# Patient Record
Sex: FEMALE | Race: WHITE
[De-identification: names, ages, dates, MRNs, and addresses within clinical notes are randomized per-mention and may not be internally consistent; named-entity substitution may affect disease eponyms.]

---

## 2018-03-07 ENCOUNTER — HOSPITAL ENCOUNTER (OUTPATIENT)
Dept: HOSPITAL 47 - RADMRIMAIN | Age: 53
Discharge: HOME | End: 2018-03-07
Payer: MEDICARE

## 2018-03-07 DIAGNOSIS — M47.812: ICD-10-CM

## 2018-03-07 DIAGNOSIS — M51.36: ICD-10-CM

## 2018-03-07 DIAGNOSIS — M50.21: ICD-10-CM

## 2018-03-07 DIAGNOSIS — M24.28: ICD-10-CM

## 2018-03-07 DIAGNOSIS — I67.1: Primary | ICD-10-CM

## 2018-03-07 DIAGNOSIS — R93.0: ICD-10-CM

## 2018-03-07 DIAGNOSIS — M50.30: ICD-10-CM

## 2018-03-07 DIAGNOSIS — M53.82: ICD-10-CM

## 2018-03-07 DIAGNOSIS — M46.96: ICD-10-CM

## 2018-03-07 DIAGNOSIS — M51.26: ICD-10-CM

## 2018-03-07 DIAGNOSIS — M48.061: ICD-10-CM

## 2018-03-07 PROCEDURE — 70553 MRI BRAIN STEM W/O & W/DYE: CPT

## 2018-03-07 PROCEDURE — 72156 MRI NECK SPINE W/O & W/DYE: CPT

## 2018-03-07 PROCEDURE — 72158 MRI LUMBAR SPINE W/O & W/DYE: CPT

## 2018-03-07 PROCEDURE — 70544 MR ANGIOGRAPHY HEAD W/O DYE: CPT

## 2018-03-07 NOTE — MR
EXAMINATION TYPE: MR angio head wo con

 

DATE OF EXAM: 3/7/2018

 

COMPARISON: 5/21/2012 and 12/21/2016 MRA brain

 

HISTORY: Cerebral aneurysm with previous coiling

 

TECHNIQUE: Time of flight images focusing on the [place] were performed without contrast. Th
ree-dimensional reconstructions performed on the same workstation.

 

FINDINGS: There is minimal flow continues into the 1.5 mm saccular aneurysmal outpouching at the site
 of prior 3 mm aneurysm, status post interventional aneurysmal coiling. This appears to emanate from 
the junction of the anterior communicating artery with the A2 segment of the right anterior cerebral 
artery. The left A1 segment is aplastic and there is a normal variant origin of the left A2 segment f
rom the right A1 segment.

 

No new intracranial aneurysm is seen. No vascular occlusion or dissection. The right vertebral artery
 is slightly dominant. Evaluation of intracranial structures is limited given technique and will be d
iscussed on the CT brain to be performed the same day.

 

IMPRESSION: 

1. Minimal residual flow into the now 1.5 mm (previously 3 mm) saccular aneurysm from the junction of
 the A2 segment of the right anterior cerebral artery with the anterior communicating artery, status 
post interventional aneurysmal coiling.

2. Aplastic left A1 segment with normal variant origin of the left A2 segment directly from the right
 A1 segment.

3. No new intracranial aneurysm.

## 2018-03-07 NOTE — MR
EXAMINATION TYPE: MR lumbar spine wo/w con

 

DATE OF EXAM: 3/7/2018

 

COMPARISON: NONE

 

HISTORY: Disc degeneration

 

Contrast: 7 mL Gadavist

 

TECHNIQUE: T1 and T2  axial and sagittal, postcontrast axial and sagittal images of the lumbar spine 
are submitted.  

 

FINDINGS: There is no abnormal signal seen within the visualized spinal cord or paraspinal soft tissu
es. Subcentimeter simple appearing left renal cyst noted.

 

At L1-2 there is degenerative disc disease and facet arthropathy. There is circumferential disc bulgi
ng but no canal stenosis. Mild bilateral foraminal encroachment.

 

At L2-3 there is severe degenerative disc disease with diffuse disc bulging and hypertrophy of the li
gamentum flavum and facet joints. There is moderate bilateral foraminal encroachment and mild central
 stenosis.

 

At L3-4 there is diffuse disc bulging with more advanced facet arthropathy and ligamentum flavum hype
rtrophy. Moderate bilateral foraminal encroachment and canal stenosis.

 

At L4-5 there is moderate to severe degenerative disc disease with advanced facet arthropathy and lig
amentum flavum hypertrophy. Diffuse disc bulging results in moderate canal stenosis and bilateral for
aminal encroachment greater on the left.

 

At L5-S1 there is moderate degenerative disc disease with marked facet arthropathy. There is diffuse 
disc bulging and moderate bilateral foraminal encroachment and evidence of canal stenosis.

 

 

IMPRESSION:

1. Multilevel degenerative disc disease with multilevel disc bulging, facet arthropathy and ligamentu
m flavum hypertrophy result in multilevel significant canal stenosis and foraminal encroachment as di
scussed above.

 

 

 

 

 

 

 

EXAMINATION TYPE: MR cervical spine wo/w con

 

DATE OF EXAM: 3/7/2018

 

COMPARISON: NONE

 

HISTORY: Disc degeneration

 

Contrast: 7 mL Gadavist

 

TECHNIQUE: T1 sagittal and coronal, T2 sagittal, and gradient echo axial, postcontrast T1 axial and s
agittal views of the cervical spine are submitted.

 

FINDINGS: The cranial cervical junction is preserved.  There is no abnormal signal seen within the sp
inal cord or paraspinal soft tissues.

 

At C2-3 there is no disc herniation or canal stenosis. No foraminal encroachment.

 

At C3-4 there is posterior spondylosis and central disc bulging. Mild effacement of thecal sac. There
 is no canal stenosis or foraminal encroachment. Mild facet arthropathy.

 

At C4-5 there is no disc herniation or canal stenosis. No foraminal encroachment.

 

At C5-6 there is right paracentral and lateral disc bulging  and uncovertebral joint hypertrophy. Res
ults in mild right-sided foraminal encroachment. No Canal stenosis.

 

At C6-7 there is no disc herniation or canal stenosis. No foraminal encroachment.

 

At C7-T1 there is no disc herniation or canal stenosis. No foraminal encroachment.

 

 

IMPRESSION: 

1.  Posterior spondylosis, uncovertebral joint hypertrophy and disc bulging at C3-C4 and C5-C6 with m
ild right-sided foraminal encroachment at C5-C6. No Canal stenosis.

## 2018-03-07 NOTE — MR
EXAMINATION TYPE: MR brain wo/w con

 

DATE OF EXAM: 3/7/2018

 

COMPARISON: 12/21/2016, 3/7/2018

 

HISTORY: Cerebral aneurysm

 

TECHNIQUE: 

Multiplanar, multisequence images of the brain and brainstem is performed without and with IV contras
t, utilizing 7 mL intravenous Gadavist .

 

FINDINGS: Diffusion weighted images demonstrate no evidence of a recent infarct or other diffusion ab
normality.

 

Craniocervical junction maintained. Artifact from the previous aneurysm clipping in the anterior inte
rcondylar communicating region noted.

 

There is an area of low signal involving the head of the caudate nucleus on the right suggestive of p
revious ischemia. No enhancing mass. No mass effect.

 

Changes of chronic right mastoiditis and sinusitis noted. There are numerous scattered areas of bilat
eral signal bilaterally within the white matter which are stable in size number and morphology with a
pproximately 20-25 areas of abnormal signal noted. No enhancing lesions. Correlate plexus cyst within
 the atrium of the lateral ventricle noted. Findings stable.

 

Sella turcica has a normal appearance. No cerebellopontine angle mass.

 

IMPRESSION: 

1. Stable post aneurysm clipping

2. Nonspecific bilateral areas of abnormal signal the white matter. Differential diagnosis includes h
ypertension, remote microvascular ischemia and demyelinating process.

3. Abnormal signal involving the head of the caudate nucleus on the right compatible with remote isch
emia.

## 2018-03-14 ENCOUNTER — HOSPITAL ENCOUNTER (OUTPATIENT)
Dept: HOSPITAL 47 - RADMAMWWP | Age: 53
Discharge: HOME | End: 2018-03-14
Payer: MEDICARE

## 2018-03-14 DIAGNOSIS — Z12.31: Primary | ICD-10-CM

## 2018-03-14 PROCEDURE — 77063 BREAST TOMOSYNTHESIS BI: CPT

## 2018-03-14 PROCEDURE — 77067 SCR MAMMO BI INCL CAD: CPT

## 2018-03-15 NOTE — MM
Reason for exam: screening  (asymptomatic).

Last mammogram was performed 2 years and 1 month ago.



History:

Patient is postmenopausal.



Physical Findings:

A clinical breast exam by your physician is recommended on an annual basis and 

results should be correlated with mammographic findings.



MG 3D Screening Mammo W/Cad

Bilateral CC and MLO view(s) were taken.

Prior study comparison: February 8, 2016, bilateral MG 3d screening mammo w/cad.  

November 5, 2014, right breast MG work up mamm w CAD RT.

The breast tissue is heterogeneously dense. This may lower the sensitivity of 

mammography.  No significant changes when compared with prior studies.





ASSESSMENT: Benign, BI-RAD 2



RECOMMENDATION:

Routine screening mammogram of both breasts in 1 year.

## 2018-04-17 ENCOUNTER — HOSPITAL ENCOUNTER (OUTPATIENT)
Dept: HOSPITAL 47 - ORWHC2ENDO | Age: 53
Discharge: HOME | End: 2018-04-17
Payer: MEDICARE

## 2018-04-17 VITALS — HEART RATE: 68 BPM | SYSTOLIC BLOOD PRESSURE: 100 MMHG | DIASTOLIC BLOOD PRESSURE: 67 MMHG

## 2018-04-17 VITALS — TEMPERATURE: 98.7 F

## 2018-04-17 DIAGNOSIS — K64.8: ICD-10-CM

## 2018-04-17 DIAGNOSIS — Z80.0: ICD-10-CM

## 2018-04-17 DIAGNOSIS — G89.4: ICD-10-CM

## 2018-04-17 DIAGNOSIS — Z79.891: ICD-10-CM

## 2018-04-17 DIAGNOSIS — F17.210: ICD-10-CM

## 2018-04-17 DIAGNOSIS — Z80.8: ICD-10-CM

## 2018-04-17 DIAGNOSIS — Q43.8: ICD-10-CM

## 2018-04-17 DIAGNOSIS — Z79.899: ICD-10-CM

## 2018-04-17 DIAGNOSIS — M54.9: ICD-10-CM

## 2018-04-17 DIAGNOSIS — Z79.82: ICD-10-CM

## 2018-04-17 DIAGNOSIS — Z12.11: Primary | ICD-10-CM

## 2018-04-17 DIAGNOSIS — K21.9: ICD-10-CM

## 2018-04-17 DIAGNOSIS — F32.9: ICD-10-CM

## 2018-04-17 DIAGNOSIS — Z79.02: ICD-10-CM

## 2018-04-17 DIAGNOSIS — Z86.010: ICD-10-CM

## 2018-04-17 DIAGNOSIS — Z96.89: ICD-10-CM

## 2018-04-17 PROCEDURE — 81025 URINE PREGNANCY TEST: CPT

## 2018-04-17 PROCEDURE — 84703 CHORIONIC GONADOTROPIN ASSAY: CPT

## 2018-04-17 PROCEDURE — 45378 DIAGNOSTIC COLONOSCOPY: CPT

## 2018-04-17 NOTE — P.DS
Providers


Attending physician: 


Tasia Chavez





Primary care physician: 


Bashir Aquino








Plan - Discharge Summary


New Discharge Prescriptions: 


No Action


   fentaNYL 75MCG/HR PATCH [Duragesic 75MCG/HR] 75 mcg TOPICAL Q3D


   buPROPion HCL [Wellbutrin XL] 150 mg PO QAM


   Ferrous Sulfate [Feosol] 325 mg PO BID


   Cholecalciferol [Vitamin D3] 2,000 unit PO DAILY


   Aspirin 81 mg PO DAILY


   Clopidogrel Bisulfate [Clopidogrel] 75 mg PO Q2D


   Carisoprodol [Soma] 350 mg PO TID


   Hydrochlorothiazide [Hydrodiuril] 12.5 mg PO DAILY


   Levomilnacipran HCl [Fetzima] 80 mg PO QAM


   Hydrocodone/Acetaminophen [Hydrocodon-Acetaminophn ] 1 tab PO QID PRN


     PRN Reason: Pain


Discharge Medication List





Aspirin 81 mg PO DAILY 02/06/17 [History]


Carisoprodol [Soma] 350 mg PO TID 02/06/17 [History]


Cholecalciferol [Vitamin D3] 2,000 unit PO DAILY 02/06/17 [History]


Clopidogrel Bisulfate [Clopidogrel] 75 mg PO Q2D 02/06/17 [History]


Ferrous Sulfate [Feosol] 325 mg PO BID 02/06/17 [History]


Hydrochlorothiazide [Hydrodiuril] 12.5 mg PO DAILY 02/06/17 [History]


Hydrocodone/Acetaminophen [Hydrocodon-Acetaminophn ] 1 tab PO QID PRN 02/ 06/17 [History]


Levomilnacipran HCl [Fetzima] 80 mg PO QAM 02/06/17 [History]


buPROPion HCL [Wellbutrin XL] 150 mg PO QAM 02/06/17 [History]


fentaNYL 75MCG/HR PATCH [Duragesic 75MCG/HR] 75 mcg TOPICAL Q3D 02/06/17 [

History]








Activity/Diet/Wound Care/Special Instructions: 


Do not drive today


Discharge Disposition: HOME SELF-CARE

## 2018-04-17 NOTE — P.PCN
Date of Procedure: 04/17/18


Preoperative Diagnosis: 


History of colon polyps, family history of colon cancer last colonoscopy in 2013


Postoperative Diagnosis: 


Tortuous redundant sigmoid colon, internal hemorrhoids


Procedure(s) Performed: 


Colonoscopy, anoscopy


Anesthesia: MAC


Surgeon: Tasia Chavez


Estimated Blood Loss (ml): 0


IV fluids (ml): 850


Pathology: none sent


Condition: stable


Disposition: PACU


Indications for Procedure: 


Prior history of polyps, family history colon cancer


Operative Findings: 


Tortuous redundant sigmoid colon, internal hemorrhoids


Description of Procedure: 


Patient was taken to the endoscopy suite and following sedation rectal exam was 

performed patient was noted to have adequate sphincter tone, no masses.  

Colonoscope was passed through the anus into the rectum.  It was passed into 

the sigmoid colon which was extremely tortuous and redundant.  The scope was 

able to be passed to the splenic flexure through the transverse colon hepatic 

flexure right colon down to the area of the cecum.  Circumferential observation 

mucosa did not reveal any lesions of concern in the cecum or right colon.  No 

lesions of concern were noted in the transverse colon or left colon or sigmoid 

colon.  The scope was brought down into the rectum it was not retroflexed but 

anoscopic exam was performed.  Internal hemorrhoids identified.  Approximately 

6 minutes were taken to withdraw the scope from the cecum to the rectum.


Impression/plan:


1.  Extremely tortuous redundant sigmoid colon


2.  Internal hemorrhoids


Plan:


1.  Conservative management


2.  Repeat scope 7-10 years

## 2018-05-12 ENCOUNTER — HOSPITAL ENCOUNTER (OUTPATIENT)
Dept: HOSPITAL 47 - RADMRIMAIN | Age: 53
Discharge: HOME | End: 2018-05-12
Attending: FAMILY MEDICINE
Payer: MEDICARE

## 2018-05-12 DIAGNOSIS — K86.2: Primary | ICD-10-CM

## 2018-05-12 PROCEDURE — 74183 MRI ABD W/O CNTR FLWD CNTR: CPT

## 2018-05-14 NOTE — MR
EXAMINATION TYPE: MR pancreas wo/w con

 

DATE OF EXAM: 5/12/2018

 

COMPARISON: 5/2/2016

 

HISTORY: Intra-abdominal and pelvic swelling/mass

 

CONTRAST: 

Standard multiplanar, multisequence MRI departmental protocol utilizing 7 mL intravenous Gadavist julio
olinium contrast.  

 

FINDINGS: 

Liver within the liver there remain scattered 6 mm and smaller cysts throughout the liver. No suspici
ous focal liver lesion. Mild prominence of the bile duct 8 mm without intraluminal filling defect. 

 

There is a complex, cystic lesion within the pancreatic tail measuring 1.9 cm craniocaudal x 3.1 cm A
P x 1.8 cm wide on the previous exam and now measuring 2 x 1.1 x 1.8 cm. This demonstrates multilocul
ar cystic components. No suspicious nodular or masslike enhancement. 

 

9 mm area of abnormal signal involving the upper margin of the left adrenal gland appear stable but a
ppears most likely related to partial volume averaging.. No definite adrenal nodule seen. Right kidne
y, and spleen appear within normal limits. Tiny 2 mm cyst in the upper pole and one within the lower 
pole left kidney.

 

No abdominal lymphadenopathy, ascites fluid, or bowel abnormality. Moderate overall stool burden. Deg
enerated levoconvex scoliosis of the lumbar spine with associated Modic type I endplate change toward
s the right at L2-L3. 

 

 

IMPRESSION:

1. There is interval reduction in size in the complicated cystic lesion involving the pancreatic tail
. Now measures 2 x 1.1 x 1.8 cm and previously measured 1.9 x 3.1 x 1.8 cm. Differential diagnosis co
ntinues to include mucinous cystic neoplasm and sidebranch IPMN as well as pancreatic complex cyst. G
iven there is reduction in size would recommend an additional 3-6 month follow-up to confirm stabilit
y.

## 2018-06-29 ENCOUNTER — HOSPITAL ENCOUNTER (OUTPATIENT)
Dept: HOSPITAL 47 - RADCTMAIN | Age: 53
Discharge: HOME | End: 2018-06-29
Attending: FAMILY MEDICINE
Payer: MEDICARE

## 2018-06-29 DIAGNOSIS — R19.09: Primary | ICD-10-CM

## 2018-06-29 PROCEDURE — 74176 CT ABD & PELVIS W/O CONTRAST: CPT

## 2018-06-29 PROCEDURE — 74160 CT ABDOMEN W/CONTRAST: CPT

## 2018-06-29 NOTE — CT
EXAMINATION TYPE: CT abdomen pelvis wo con, CT pancreas biphase

 

DATE OF EXAM: 6/29/2018

 

HISTORY: Microscopic hematuria, pancreatic mass

 

CT DLP: 1663 (renal stone, biphase pancreas) (accession [id number]), 1663 (renal stone and biphase panc
reas) (accession [id number]) mGycm.  Automated Exposure Control for Dose Reduction was Utilized.

 

TECHNIQUE:  CT scan of the abdomen and pelvis is performed with oral water and without and IV contras
t.

 

COMPARISON: CT abdomen April 19, 2016

 

FINDINGS:  Within the limitations of a non-contrast study, the following observations are made.

 

LUNG BASES: Dependent atelectasis in both bases is present. There is linear scarring or atelectasis i
n the lingula axial image 2 series 5.

 

LIVER/GB: There are some scattered subcentimeter low dense lesions throughout the liver too small to 
further characterize per presumed benign. Cholecystectomy clips are now present.

 

PANCREAS: Pancreas is overall normal in size. There is thin-walled 1.4 cm low dense lesion or cyst in
 the pancreatic tail axial image 49 which appears less prominent in size from 2016 CT. Remainder of p
ancreas shows no new solid or cystic mass. No calcification or ductal dilatation is seen.

 

SPLEEN: No significant abnormality is seen.

 

ADRENALS: No significant abnormality is seen.

 

KIDNEYS: No renal calculi are evident bilaterally. There is symmetric cortical medullary uptake witho
ut evidence of concerning solid or cystic renal mass or hydronephrosis bilaterally.

 

BOWEL: Normal-appearing appendix is seen from cecum

 

GENITAL ORGANS: There is retroverted lobulated fibroid type uterus.

 

LYMPH NODES: No greater than 1cm abdominal or pelvic lymph nodes are appreciated.

 

OSSEOUS STRUCTURES: There is levoconvex scoliosis with multilevel moderate to severe spurring and dis
c space narrowing. There is multilevel facet arthropathy throughout the lumbar spine most prominent i
n lower lumbar levels.

 

OTHER: No significant additional abnormality is seen.

 

IMPRESSION: 

1. No renal stones or hydronephrosis is seen bilaterally. 

2.  A 1.4 cm thin-walled cyst or cystic lesion pancreatic tail is diminished in size from prior CT paris
ggesting benign etiology or resolving pseudocyst.

## 2020-09-09 ENCOUNTER — HOSPITAL ENCOUNTER (OUTPATIENT)
Dept: HOSPITAL 47 - RADXRMAIN | Age: 55
Discharge: HOME | End: 2020-09-09
Attending: FAMILY MEDICINE
Payer: MEDICARE

## 2020-09-09 DIAGNOSIS — M25.742: ICD-10-CM

## 2020-09-09 DIAGNOSIS — M67.844: Primary | ICD-10-CM

## 2020-09-09 DIAGNOSIS — M25.422: ICD-10-CM

## 2020-09-09 NOTE — XR
EXAMINATION TYPE: XR elbow complete 3 views LT, XR fifth finger 3 views LT

 

DATE OF EXAM: 9/9/2020

 

COMPARISON: NONE

 

HISTORY: 55-year-old female pain in left elbow and fifth finger.

 

 

FINDINGS: 

 

Left elbow:

Small corticated ossific densities just distal to both medial and lateral epicondyles. Spurring at bhavik
th medial and lateral epicondyles. Unable to exclude elevation of the posterior fat pad. However, no 
acute fracture, subluxation, dislocation is seen.  No acute fracture, subluxation, dislocation seen.

 

Left fifth finger:

Mild degenerative spurring at the fifth DIP joint. No acute fracture, subluxation, dislocation is see
n.

 

 

 

IMPRESSION: 

 

1. Left elbow: Bony changes of chronic tendinopathy at the common extensor and common flexor tendon o
rigins. Unable to exclude an underlying elbow joint effusion though no acute osseous abnormality is s
een at this time. If concern for an occult injury, MRI can be considered.

 

2. Left fifth finger: Degenerative spurring at the fifth MP joints. No acute osseous abnormality seen
.

## 2020-11-27 ENCOUNTER — HOSPITAL ENCOUNTER (OUTPATIENT)
Dept: HOSPITAL 47 - RADMAMWWP | Age: 55
Discharge: HOME | End: 2020-11-27
Attending: FAMILY MEDICINE
Payer: MEDICARE

## 2020-11-27 DIAGNOSIS — Z12.31: Primary | ICD-10-CM

## 2020-11-27 PROCEDURE — 77067 SCR MAMMO BI INCL CAD: CPT

## 2020-12-01 NOTE — MM
Reason for exam: screening  (asymptomatic).

Last mammogram was performed 2 years and 8 months ago.



History:

Patient is postmenopausal.



Physical Findings:

A clinical breast exam by your physician is recommended on an annual basis and 

results should be correlated with mammographic findings.



MG Screening Mammo w CAD

Bilateral CC and MLO view(s) were taken.

Prior study comparison: March 14, 2018, bilateral MG 3d screening mammo w/cad.  

February 8, 2016, bilateral MG 3d screening mammo w/cad.

The breast tissue is heterogeneously dense. This may lower the sensitivity of 

mammography.  No significant changes when compared with prior studies.





ASSESSMENT: Negative, BI-RAD 1



RECOMMENDATION:

Routine screening mammogram of both breasts in 1 year.